# Patient Record
Sex: MALE | URBAN - METROPOLITAN AREA
[De-identification: names, ages, dates, MRNs, and addresses within clinical notes are randomized per-mention and may not be internally consistent; named-entity substitution may affect disease eponyms.]

---

## 2017-01-24 ENCOUNTER — CARE COORDINATION (OUTPATIENT)
Dept: UROLOGY | Facility: CLINIC | Age: 37
End: 2017-01-24

## 2017-01-24 NOTE — PROGRESS NOTES
Left message to see when pt is having his pre-op physical done     Soledad West, RN   Care Coordinator Urology

## 2017-01-31 ENCOUNTER — ANESTHESIA (OUTPATIENT)
Dept: SURGERY | Facility: AMBULATORY SURGERY CENTER | Age: 37
End: 2017-01-31

## 2017-01-31 DIAGNOSIS — N43.3 RIGHT HYDROCELE: Primary | ICD-10-CM

## 2017-01-31 DIAGNOSIS — Z98.52 S/P VASECTOMY: ICD-10-CM

## 2017-01-31 RX ORDER — FENTANYL CITRATE 50 UG/ML
INJECTION, SOLUTION INTRAMUSCULAR; INTRAVENOUS PRN
Status: DISCONTINUED | OUTPATIENT
Start: 2017-01-31 | End: 2017-01-31

## 2017-01-31 RX ORDER — ONDANSETRON 2 MG/ML
INJECTION INTRAMUSCULAR; INTRAVENOUS PRN
Status: DISCONTINUED | OUTPATIENT
Start: 2017-01-31 | End: 2017-01-31

## 2017-01-31 RX ORDER — GLYCOPYRROLATE 0.2 MG/ML
INJECTION, SOLUTION INTRAMUSCULAR; INTRAVENOUS PRN
Status: DISCONTINUED | OUTPATIENT
Start: 2017-01-31 | End: 2017-01-31

## 2017-01-31 RX ORDER — KETOROLAC TROMETHAMINE 30 MG/ML
INJECTION, SOLUTION INTRAMUSCULAR; INTRAVENOUS PRN
Status: DISCONTINUED | OUTPATIENT
Start: 2017-01-31 | End: 2017-01-31

## 2017-01-31 RX ORDER — LIDOCAINE HYDROCHLORIDE 20 MG/ML
INJECTION, SOLUTION INFILTRATION; PERINEURAL PRN
Status: DISCONTINUED | OUTPATIENT
Start: 2017-01-31 | End: 2017-01-31

## 2017-01-31 RX ORDER — DEXAMETHASONE SODIUM PHOSPHATE 4 MG/ML
INJECTION, SOLUTION INTRA-ARTICULAR; INTRALESIONAL; INTRAMUSCULAR; INTRAVENOUS; SOFT TISSUE PRN
Status: DISCONTINUED | OUTPATIENT
Start: 2017-01-31 | End: 2017-01-31

## 2017-01-31 RX ORDER — PROPOFOL 10 MG/ML
INJECTION, EMULSION INTRAVENOUS PRN
Status: DISCONTINUED | OUTPATIENT
Start: 2017-01-31 | End: 2017-01-31

## 2017-01-31 RX ORDER — CEFAZOLIN SODIUM 1 G/3ML
1 INJECTION, POWDER, FOR SOLUTION INTRAMUSCULAR; INTRAVENOUS SEE ADMIN INSTRUCTIONS
Status: CANCELLED | OUTPATIENT
Start: 2017-01-31

## 2017-01-31 RX ADMIN — FENTANYL CITRATE 50 MCG: 50 INJECTION, SOLUTION INTRAMUSCULAR; INTRAVENOUS at 08:51

## 2017-01-31 RX ADMIN — SODIUM CHLORIDE, SODIUM LACTATE, POTASSIUM CHLORIDE, CALCIUM CHLORIDE: 600; 310; 30; 20 INJECTION, SOLUTION INTRAVENOUS at 12:30

## 2017-01-31 RX ADMIN — Medication 0.5 MG: at 12:08

## 2017-01-31 RX ADMIN — ONDANSETRON 4 MG: 2 INJECTION INTRAMUSCULAR; INTRAVENOUS at 12:48

## 2017-01-31 RX ADMIN — PROPOFOL 300 MG: 10 INJECTION, EMULSION INTRAVENOUS at 08:52

## 2017-01-31 RX ADMIN — PROPOFOL 50 MG: 10 INJECTION, EMULSION INTRAVENOUS at 09:14

## 2017-01-31 RX ADMIN — FENTANYL CITRATE 50 MCG: 50 INJECTION, SOLUTION INTRAMUSCULAR; INTRAVENOUS at 09:14

## 2017-01-31 RX ADMIN — LIDOCAINE HYDROCHLORIDE 70 MG: 20 INJECTION, SOLUTION INFILTRATION; PERINEURAL at 08:52

## 2017-01-31 RX ADMIN — Medication 0.5 MG: at 10:57

## 2017-01-31 RX ADMIN — KETOROLAC TROMETHAMINE 30 MG: 30 INJECTION, SOLUTION INTRAMUSCULAR; INTRAVENOUS at 12:48

## 2017-01-31 RX ADMIN — DEXAMETHASONE SODIUM PHOSPHATE 4 MG: 4 INJECTION, SOLUTION INTRA-ARTICULAR; INTRALESIONAL; INTRAMUSCULAR; INTRAVENOUS; SOFT TISSUE at 08:56

## 2017-01-31 RX ADMIN — GLYCOPYRROLATE 0.2 MG: 0.2 INJECTION, SOLUTION INTRAMUSCULAR; INTRAVENOUS at 08:51

## 2017-02-02 ENCOUNTER — CARE COORDINATION (OUTPATIENT)
Dept: UROLOGY | Facility: CLINIC | Age: 37
End: 2017-02-02

## 2017-02-02 NOTE — PROGRESS NOTES
Left message to see how pt is doing. He needs semen analysis in 6 weeks     Soledad West, RN   Care Coordinator Urology

## 2017-02-15 ENCOUNTER — OFFICE VISIT (OUTPATIENT)
Dept: UROLOGY | Facility: CLINIC | Age: 37
End: 2017-02-15
Payer: COMMERCIAL

## 2017-02-15 VITALS — DIASTOLIC BLOOD PRESSURE: 93 MMHG | SYSTOLIC BLOOD PRESSURE: 148 MMHG

## 2017-02-15 DIAGNOSIS — Z31.41 FERTILITY TESTING: Primary | ICD-10-CM

## 2017-02-15 PROCEDURE — 99024 POSTOP FOLLOW-UP VISIT: CPT | Performed by: UROLOGY

## 2017-02-15 ASSESSMENT — PAIN SCALES - GENERAL: PAINLEVEL: NO PAIN (0)

## 2017-02-15 NOTE — PATIENT INSTRUCTIONS
Semen Analysis (Strict Morph). For the sample the patient needs to abstain from ejaculation for 2-5 days prior, and the sample should be collected in clinic. Ejaculating too frequently can deplete the count whereas abstaining for long periods of time can decrease the number of live sperm. No lubrication, powders, saliva, or intercourse can be used. Partners can be in the room and help produce the sample.     [facility]  [facility]  [address]  ?[address]  [place], [place] [id number] [phone]    [facility]  [address] [place], [place] [id number] [phone]

## 2017-02-15 NOTE — PROGRESS NOTES
CC: Naveen Figueroa is post-op from bilateral vasovasostomy and right hydrocele repair.  HPI: Patient is 2 wks post op.  he has been doing well. No fevers or chills. Pain decreasing.   Exam: Afebrile. NAD.   Incisions healing well. No discharge, erythema or fluctuence suggestive of infection.   No hematoma or hydrocele.  Slight fullness right scrotum consistent with post-hydrocele repair.    PLAN: F/U 6 weeks for semen analysis and semen analysis again at 3 month post op.  Discussed with him plan is semen analysis q3 month x 1 year unless pregnant.    Merari COHN  Visit within post-op global.

## 2017-02-15 NOTE — NURSING NOTE
"Naveen Figueroa's goals for this visit include:   Chief Complaint   Patient presents with     RECHECK     2 week post op        He requests these members of his care team be copied on today's visit information: yes    PCP: Claudia Mckinney    Referring Provider:  No referring provider defined for this encounter.    Chief Complaint   Patient presents with     RECHECK     2 week post op        Initial BP (!) 148/93 (BP Location: Left arm, Patient Position: Chair, Cuff Size: Adult Regular) Estimated body mass index is 25.6 kg/(m^2) as calculated from the following:    Height as of 1/31/17: 1.854 m (6' 1\").    Weight as of 1/31/17: 88 kg (194 lb).  Medication Reconciliation: complete    Do you need any medication refills at today's visit? MAT Huitron CMA        "

## 2017-02-15 NOTE — MR AVS SNAPSHOT
After Visit Summary   2/15/2017    Naveen Figueroa    MRN: [id number]           Patient Information     Date Of Birth          1980        Visit Information        Provider Department      2/15/2017 8:30 AM Neno Ness MD [facility]        Today's Diagnoses     Fertility testing    -  1      Care Instructions    Semen Analysis (Strict Morph). For the sample the patient needs to abstain from ejaculation for 2-5 days prior, and the sample should be collected in clinic. Ejaculating too frequently can deplete the count whereas abstaining for long periods of time can decrease the number of live sperm. No lubrication, powders, saliva, or intercourse can be used. Partners can be in the room and help produce the sample.     [facility]  [facility]  [address]  ?[address] [place] [address] [phone]    [facility]  [address] [place] [address] [phone]          Follow-ups after your visit        Future tests that were ordered for you today     Open Future Orders        Priority Expected Expires Ordered    Semen Analysis, Strict Morphology (ALFREDA) Routine 3/29/2017 2/15/2018 2/15/2017            Who to contact     If you have questions or need follow up information about today's clinic visit or your schedule please contact [facility] directly at [phone].  Normal or non-critical lab and imaging results will be communicated to you by MyChart, letter or phone within 4 business days after the clinic has received the results. If you do not hear from us within 7 days, please contact the clinic through MyChart or phone. If you have a critical or abnormal lab result, we will notify you by phone as soon as possible.  Submit refill requests through [employer] or call your pharmacy and they will forward the refill request to us. Please allow 3 business days for your refill to be  completed.          Additional Information About Your Visit        [employer] Information     Think-Now is an electronic gateway that provides easy, online access to your medical records. With Think-Now, you can request a clinic appointment, read your test results, renew a prescription or communicate with your care team.     To sign up for Think-Now visit the website at [email or web address]   You will be asked to enter the access code listed below, as well as some personal information. Please follow the directions to create your username and password.     Your access code is: [email or web address]  Expires: 2017  8:56 AM     Your access code will  in 90 days. If you need help or a new code, please contact your [facility] or call [phone] for assistance.        Care EveryWhere ID     This is your Care EveryWhere ID. This could be used by other organizations to access your [place] medical records  [id number]         Blood Pressure from Last 3 Encounters:   02/15/17 (!) 148/93   17 145/80   16 137/90    Weight from Last 3 Encounters:   17 88 kg (194 lb)   16 86.6 kg (191 lb)   10/07/15 86.6 kg (191 lb)               Primary Care Provider Office Phone # Fax #    [facility] [phone]       [address]  [facility] [address]        Thank you!     Thank you for choosing [facility]  for your care. Our goal is always to provide you with excellent care. Hearing back from our patients is one way we can continue to improve our services. Please take a few minutes to complete the written survey that you may receive in the mail after your visit with us. Thank you!             Your Updated Medication List - Protect others around you: Learn how to safely use, store and throw away your medicines at www.disposemymeds.org.          This list is accurate as of: 2/15/17  8:56 AM.  Always use your most recent med list.                    Brand Name Dispense Instructions for use    oxyCODONE-acetaminophen 5-325 MG per tablet    PERCOCET    35 tablet    Take 1 tablet by mouth every 4 hours as needed for pain